# Patient Record
Sex: MALE | Race: WHITE | NOT HISPANIC OR LATINO | URBAN - METROPOLITAN AREA
[De-identification: names, ages, dates, MRNs, and addresses within clinical notes are randomized per-mention and may not be internally consistent; named-entity substitution may affect disease eponyms.]

---

## 2024-08-09 ENCOUNTER — OFFICE (OUTPATIENT)
Dept: URBAN - METROPOLITAN AREA CLINIC 11 | Facility: CLINIC | Age: 54
End: 2024-08-09
Payer: COMMERCIAL

## 2024-08-09 VITALS
HEIGHT: 70 IN | OXYGEN SATURATION: 100 % | WEIGHT: 216 LBS | HEART RATE: 87 BPM | SYSTOLIC BLOOD PRESSURE: 139 MMHG | DIASTOLIC BLOOD PRESSURE: 59 MMHG

## 2024-08-09 DIAGNOSIS — R10.84 GENERALIZED ABDOMINAL PAIN: ICD-10-CM

## 2024-08-09 DIAGNOSIS — Z80.0 FAMILY HISTORY OF MALIGNANT NEOPLASM OF DIGESTIVE ORGANS: ICD-10-CM

## 2024-08-09 DIAGNOSIS — D50.9 IRON DEFICIENCY ANEMIA, UNSPECIFIED: ICD-10-CM

## 2024-08-09 PROCEDURE — 99204 OFFICE O/P NEW MOD 45 MIN: CPT | Performed by: NURSE PRACTITIONER

## 2024-08-09 RX ORDER — SODIUM PICOSULFATE, MAGNESIUM OXIDE, AND ANHYDROUS CITRIC ACID 12; 3.5; 1 G/175ML; G/175ML; MG/175ML
LIQUID ORAL
Qty: 1 | Refills: 0 | Status: ACTIVE
Start: 2024-08-09 | End: 2024-08-09

## 2024-08-09 NOTE — SERVICEHPINOTES
Mr. Escudero is a 54  year old male here today with complaints of abdominal pain.  He has had an intermittent cramping type mid to lower abdominal pain for the last several months.  The pain is occasionally sharp and is worse at night.   He had an ultrasound that was unremarkable.   The he has occasional epigastric pain and heartburn with heavy meals were acidic foods   Maybe 3 to 4 times a month.   He has had iron deficiency anemia has been on oral iron replacement for almost a year.   Labs on 07/19 showed iron saturation 4%, iron 15, ferritin 46.  His PCP changed him to Integra Plus.    He denies any nausea, vomiting, change in bowel habits, blood in stool.  He has never had a colonoscopy.  His mother did have colon cancer.

## 2024-08-09 NOTE — SERVICENOTES
Will get DB to further evaluate iron deficiency anemia.  he will continue to follow up PCP for monitoring and management.   I feel his abdominal pain could possibly be gastritis related.  He will try OTC omeprazole as needed.